# Patient Record
Sex: FEMALE | Employment: FULL TIME | URBAN - METROPOLITAN AREA
[De-identification: names, ages, dates, MRNs, and addresses within clinical notes are randomized per-mention and may not be internally consistent; named-entity substitution may affect disease eponyms.]

---

## 2018-04-02 ENCOUNTER — OFFICE VISIT (OUTPATIENT)
Dept: URGENT CARE | Facility: MEDICAL CENTER | Age: 23
End: 2018-04-02
Payer: COMMERCIAL

## 2018-04-02 VITALS
RESPIRATION RATE: 20 BRPM | WEIGHT: 218.2 LBS | HEART RATE: 98 BPM | BODY MASS INDEX: 40.15 KG/M2 | TEMPERATURE: 97.8 F | OXYGEN SATURATION: 98 % | SYSTOLIC BLOOD PRESSURE: 120 MMHG | DIASTOLIC BLOOD PRESSURE: 78 MMHG | HEIGHT: 62 IN

## 2018-04-02 DIAGNOSIS — J30.2 ACUTE SEASONAL ALLERGIC RHINITIS, UNSPECIFIED TRIGGER: Primary | ICD-10-CM

## 2018-04-02 PROCEDURE — G0382 LEV 3 HOSP TYPE B ED VISIT: HCPCS | Performed by: NURSE PRACTITIONER

## 2018-04-02 RX ORDER — FLUTICASONE PROPIONATE 50 MCG
1 SPRAY, SUSPENSION (ML) NASAL DAILY
Qty: 16 G | Refills: 0 | Status: SHIPPED | OUTPATIENT
Start: 2018-04-02

## 2018-04-02 RX ORDER — DESOGESTREL AND ETHINYL ESTRADIOL 0.15-0.03
1 KIT ORAL DAILY
COMMUNITY

## 2018-04-02 NOTE — PROGRESS NOTES
[facility]    NAME: Mk Santiago is a 23 y o  female  : 1995    MRN: [id number]  DATE: 2018  TIME: 2:20 PM    Assessment and Plan   Acute seasonal allergic rhinitis, unspecified trigger [J30 2]  1  Acute seasonal allergic rhinitis, unspecified trigger  Pseudoephedrine-Ibuprofen (ADVIL COLD/SINUS)  MG TABS    fluticasone (FLONASE) 50 mcg/act nasal spray         Patient Instructions   1  Advil cold and sinus as directed for congestion  2  Flonase 1 spray each nostril daily  3  Tylenol as needed for fever/pain  4  Once you are no longer congestion start Claritin daily  5  Follow up with your PCP      Chief Complaint     Chief Complaint   Patient presents with    Cold Like Symptoms     x 6 days- sore thoat/cough and head congestion  sinus pain          History of Present Illness       22 y/o female presents with cough, sore throat, sinus congestion and headache for 6 days  She denies abdominal pain, N/V/D, ear pain, CP, or SOB  She has taken Theraflu, Afrin, Mucinex, and Tylenol with minimal relief  Review of Systems   Review of Systems   Constitutional: Negative for chills, fatigue and fever  HENT: Positive for congestion, postnasal drip, rhinorrhea and sinus pain  Negative for ear pain and sore throat  Respiratory: Positive for cough  Negative for shortness of breath  Cardiovascular: Negative for chest pain  Gastrointestinal: Negative for abdominal pain, diarrhea, nausea and vomiting  Musculoskeletal: Negative for myalgias  Skin: Negative for rash  Neurological: Positive for headaches           Current Medications       Current Outpatient Prescriptions:     desogestrel-ethinyl estradiol (APRI) 0 15-30 MG-MCG per tablet, Take 1 tablet by mouth daily, Disp: , Rfl:     fluticasone (FLONASE) 50 mcg/act nasal spray, 1 spray into each nostril daily, Disp: 16 g, Rfl: 0    Pseudoephedrine-Ibuprofen (ADVIL COLD/SINUS)  MG TABS, Take 2 tablets by mouth every 8 (eight) hours as needed (congestion), Disp: 20 each, Rfl: 0    Current Allergies     Allergies as of 04/02/2018 - Reviewed 04/02/2018   Allergen Reaction Noted    Prednisone Confusion 04/02/2018            The following portions of the patient's history were reviewed and updated as appropriate: allergies, current medications, past family history, past medical history, past social history, past surgical history and problem list      Past Medical History:   Diagnosis Date    Heart murmur        Past Surgical History:   Procedure Laterality Date    WISDOM TOOTH EXTRACTION         History reviewed  No pertinent family history  Medications have been verified  Objective   /78   Pulse 98   Temp 97 8 °F (36 6 °C) (Temporal)   Resp 20   Ht 5' 2" (1 575 m)   Wt 99 kg (218 lb 3 2 oz)   SpO2 98%   BMI 39 91 kg/m²        Physical Exam     Physical Exam   Constitutional: She is oriented to person, place, and time  She appears well-developed and well-nourished  She is active  No distress  HENT:   Head: Normocephalic  Right Ear: Tympanic membrane, external ear and ear canal normal    Left Ear: Tympanic membrane, external ear and ear canal normal    Nose: Rhinorrhea present  Mouth/Throat: Uvula is midline and oropharynx is clear and moist  No oropharyngeal exudate, posterior oropharyngeal edema or posterior oropharyngeal erythema  Eyes: Right eye exhibits no discharge  Left eye exhibits no discharge  Neck: Normal range of motion  Cardiovascular: Normal rate, regular rhythm, S1 normal, S2 normal and normal heart sounds  Exam reveals no S3 and no distant heart sounds  No murmur heard  Pulmonary/Chest: Effort normal and breath sounds normal  No respiratory distress  She has no decreased breath sounds  She has no wheezes  She has no rales  She exhibits no tenderness  Abdominal: Soft  There is no tenderness     Lymphadenopathy:        Head (right side): No submental and no submandibular adenopathy present  Head (left side): No submental and no submandibular adenopathy present  Neurological: She is alert and oriented to person, place, and time  GCS eye subscore is 4  GCS verbal subscore is 5  GCS motor subscore is 6  Skin: Skin is warm and dry  No rash noted  She is not diaphoretic

## 2018-04-02 NOTE — PATIENT INSTRUCTIONS
1  Advil cold and sinus as directed for congestion  2  Flonase 1 spray each nostril daily  3  Tylenol as needed for fever/pain  4  Once you are no longer congestion start Claritin daily  5   Follow up with your PCP

## 2018-04-02 NOTE — LETTER
April 2, 2018     Patient: Everett Mcclellan   YOB: 1995   Date of Visit: 4/2/2018       To Whom it May Concern:    Everett Mcclellan was seen in my clinic on 4/2/2018  She may return to work on 4/3/2018  If you have any questions or concerns, please don't hesitate to call           Sincerely,          [facility] [place]        CC: No Recipients

## 2021-03-31 ENCOUNTER — HOSPITAL ENCOUNTER (EMERGENCY)
Facility: HOSPITAL | Age: 26
Discharge: HOME | End: 2021-03-31
Attending: EMERGENCY MEDICINE
Payer: COMMERCIAL

## 2021-03-31 VITALS
HEART RATE: 98 BPM | DIASTOLIC BLOOD PRESSURE: 63 MMHG | OXYGEN SATURATION: 99 % | RESPIRATION RATE: 16 BRPM | TEMPERATURE: 98 F | BODY MASS INDEX: 39.92 KG/M2 | SYSTOLIC BLOOD PRESSURE: 134 MMHG | WEIGHT: 218.26 LBS

## 2021-03-31 DIAGNOSIS — O28.3 ABNORMAL FETAL ULTRASOUND: Primary | ICD-10-CM

## 2021-03-31 PROCEDURE — 99284 EMERGENCY DEPT VISIT MOD MDM: CPT | Performed by: EMERGENCY MEDICINE

## 2021-03-31 PROCEDURE — 99283 EMERGENCY DEPT VISIT LOW MDM: CPT

## 2021-03-31 NOTE — ED PROVIDER NOTES
Pt Name: Maria Eugenia Mujica  MRN: [id number]  [place] 1995  Age/Sex: 30 y o  female  Date of evaluation: 3/31/2021  PCP: Lila Julian MD    CHIEF COMPLAINT    Chief Complaint   Patient presents with    Threatened Miscarriage     Pt had an OB appt yesterday and they could find a heartbeat  Pt here today to "double check"          HPI    30 y o  female presenting with request for a recheck of an ultrasound  Patient states she was seen by her OB yesterday, and ultrasound was performed revealing twin gestation, unfortunately, there was no heartbeat with either fetus  Patient denies any current symptoms including pain or bleeding but is requesting repeat ultrasound in order to verify results of ultrasound yesterday  She denies trauma, fever, other symptoms  HPI      Past Medical and Surgical History    Past Medical History:   Diagnosis Date    Heart murmur        Past Surgical History:   Procedure Laterality Date    WISDOM TOOTH EXTRACTION         No family history on file  Social History     Tobacco Use    Smoking status: Never Smoker    Smokeless tobacco: Never Used   Substance Use Topics    Alcohol use: Yes     Comment: social    Drug use: Yes     Types: Marijuana           Allergies    Allergies   Allergen Reactions    Prednisone Confusion       Home Medications    Prior to Admission medications    Medication Sig Start Date End Date Taking? Authorizing Provider   desogestrel-ethinyl estradiol (APRI) 0 15-30 MG-MCG per tablet Take 1 tablet by mouth daily    Historical Provider, MD   fluticasone (FLONASE) 50 mcg/act nasal spray 1 spray into each nostril daily 4/2/18   CHETAN Owusu   Pseudoephedrine-Ibuprofen (ADVIL COLD/SINUS)  MG TABS Take 2 tablets by mouth every 8 (eight) hours as needed (congestion) 4/2/18   CHETAN Owusu           Review of Systems    Review of Systems   Constitutional: Negative for activity change, chills and fever     HENT: Negative for drooling and facial swelling  Eyes: Negative for pain, discharge and visual disturbance  Respiratory: Negative for apnea, cough, chest tightness, shortness of breath and wheezing  Cardiovascular: Negative for chest pain and leg swelling  Gastrointestinal: Negative for abdominal pain, constipation, diarrhea, nausea and vomiting  Genitourinary: Negative for difficulty urinating, dysuria and urgency  Musculoskeletal: Negative for arthralgias, back pain and gait problem  Skin: Negative for color change and rash  Neurological: Negative for dizziness, speech difficulty, weakness and headaches  Psychiatric/Behavioral: Negative for agitation, behavioral problems and confusion  All other systems reviewed and negative  Physical Exam      ED Triage Vitals [03/31/21 1021]   Temperature Pulse Respirations Blood Pressure SpO2   98 °F (36 7 °C) 98 16 134/63 99 %      Temp src Heart Rate Source Patient Position - Orthostatic VS BP Location FiO2 (%)   -- -- -- -- --      Pain Score       --               Physical Exam  Vitals signs and nursing note reviewed  Constitutional:       Appearance: She is well-developed  HENT:      Head: Normocephalic and atraumatic  Eyes:      Conjunctiva/sclera: Conjunctivae normal       Pupils: Pupils are equal, round, and reactive to light  Neck:      Musculoskeletal: Normal range of motion and neck supple  Cardiovascular:      Rate and Rhythm: Normal rate and regular rhythm  Heart sounds: Normal heart sounds  Pulmonary:      Effort: Pulmonary effort is normal  No respiratory distress  Breath sounds: Normal breath sounds  No wheezing or rales  Abdominal:      General: There is no distension  Palpations: Abdomen is soft  Tenderness: There is no abdominal tenderness  There is no guarding or rebound  Comments: Slightly gravid   Musculoskeletal: Normal range of motion  General: No deformity  Skin:     General: Skin is warm and dry  Findings: No erythema or rash  Neurological:      Mental Status: She is alert and oriented to person, place, and time  Psychiatric:         Behavior: Behavior normal          Thought Content: Thought content normal          Judgment: Judgment normal               Diagnostic Results    Bedside ultrasound performed, two fetuses identified but unfortunately no heartbeat was identified in either despite multiple imaging windows  Labs:    Results Reviewed     None          All labs reviewed and utilized in the medical decision making process    Radiology:    No orders to display       All radiology studies independently viewed by me and interpreted by the radiologist     Procedure    Procedures        ED Course of Care and Re-Assessments      Patient counseled regarding ultrasound findings  Medications - No data to display        FINAL IMPRESSION    Final diagnoses:   Abnormal fetal ultrasound         DISPOSITION/PLAN    Presentation as above with request for verification of sound for yesterday  Bedside ultrasound performed with no heartbeat identified in either feet is unsure station as above  Patient counseled regarding findings as well as diagnostic uncertainty with use bedside ultrasound, referred back to [facility] [place] for scheduled appointment today at 1330  Given strict precautions  Time reflects when diagnosis was documented in both MDM as applicable and the Disposition within this note     Time User Action Codes Description Comment    3/31/2021 10:39 AM Yobany GOFF Add [O28 3] Abnormal fetal ultrasound       ED Disposition     ED Disposition Condition Date/Time Comment    Discharge Stable Wed Mar 31, 2021 10:36 AM Chandra Room discharge to home/self care              Follow-up Information     Follow up With Specialties Details Why Contact Info Additional [address] [facility] Emergency Medicine Go to  If symptoms worsen [place] [address]  [address] [facility], [address] [facility], [place], [place], [address]    Your OBGYN provider  Go today As previously scheduled [facility] - [address] [facility], [address] [place] [address] [place]   [phone]             PATIENT REFERRED TO:    [address]  [address] [facility] [address] [phone]  Go to   If symptoms worsen    Your OBGYN provider  [facility] - [address] [facility], [address] [facility] [address]   [phone]  Go today  As previously scheduled      DISCHARGE MEDICATIONS:    Discharge Medication List as of 3/31/2021 10:41 AM      CONTINUE these medications which have NOT CHANGED    Details   desogestrel-ethinyl estradiol (APRI) 0 15-30 MG-MCG per tablet Take 1 tablet by mouth daily, Historical Med      fluticasone (FLONASE) 50 mcg/act nasal spray 1 spray into each nostril daily, Starting Mon 4/2/2018, Normal      Pseudoephedrine-Ibuprofen (ADVIL COLD/SINUS)  MG TABS Take 2 tablets by mouth every 8 (eight) hours as needed (congestion), Starting Mon 4/2/2018, Normal             No discharge procedures on file           MD Jayashree Martinez MD  03/31/21 [id number]

## 2025-02-03 ENCOUNTER — HOSPITAL ENCOUNTER (EMERGENCY)
Facility: HOSPITAL | Age: 30
Discharge: HOME | End: 2025-02-03
Attending: EMERGENCY MEDICINE
Payer: COMMERCIAL

## 2025-02-03 ENCOUNTER — APPOINTMENT (EMERGENCY)
Dept: CT IMAGING | Facility: HOSPITAL | Age: 30
End: 2025-02-03
Payer: COMMERCIAL

## 2025-02-03 ENCOUNTER — APPOINTMENT (EMERGENCY)
Dept: RADIOLOGY | Facility: HOSPITAL | Age: 30
End: 2025-02-03
Payer: COMMERCIAL

## 2025-02-03 VITALS
SYSTOLIC BLOOD PRESSURE: 116 MMHG | HEIGHT: 63 IN | RESPIRATION RATE: 20 BRPM | BODY MASS INDEX: 41.99 KG/M2 | WEIGHT: 236.99 LBS | HEART RATE: 87 BPM | TEMPERATURE: 97.8 F | DIASTOLIC BLOOD PRESSURE: 67 MMHG | OXYGEN SATURATION: 99 %

## 2025-02-03 DIAGNOSIS — S20.211A RIB CONTUSION, RIGHT, INITIAL ENCOUNTER: ICD-10-CM
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
## 2025-02-03 DIAGNOSIS — M25.552 LEFT HIP PAIN: ICD-10-CM

## 2025-02-03 DIAGNOSIS — S50.12XA CONTUSION OF LEFT FOREARM, INITIAL ENCOUNTER: ICD-10-CM

## 2025-02-03 DIAGNOSIS — S30.1XXA CONTUSION OF ABDOMINAL WALL, INITIAL ENCOUNTER: ICD-10-CM

## 2025-02-03 LAB
ATRIAL RATE: 86 BPM
ATRIAL RATE: 92 BPM
BILIRUB UR QL STRIP: NEGATIVE
CLARITY UR: CLEAR
COLOR UR: COLORLESS
EXT PREGNANCY TEST URINE: NEGATIVE
EXT. CONTROL: NORMAL
GLUCOSE UR STRIP-MCNC: NEGATIVE MG/DL
HGB UR QL STRIP.AUTO: NEGATIVE
KETONES UR STRIP-MCNC: NEGATIVE MG/DL
LEUKOCYTE ESTERASE UR QL STRIP: NEGATIVE
NITRITE UR QL STRIP: NEGATIVE
P AXIS: 44 DEGREES
P AXIS: 57 DEGREES
PH UR STRIP.AUTO: 6 [PH]
PR INTERVAL: 182 MS
PR INTERVAL: 186 MS
PROT UR STRIP-MCNC: NEGATIVE MG/DL
QRS AXIS: 53 DEGREES
QRS AXIS: 57 DEGREES
QRSD INTERVAL: 76 MS
QRSD INTERVAL: 82 MS
QT INTERVAL: 342 MS
QT INTERVAL: 364 MS
QTC INTERVAL: 422 MS
QTC INTERVAL: 435 MS
SP GR UR STRIP.AUTO: 1.03 (ref 1–1.03)
T WAVE AXIS: 28 DEGREES
T WAVE AXIS: 30 DEGREES
UROBILINOGEN UR STRIP-ACNC: <2 MG/DL
VENTRICULAR RATE: 86 BPM
VENTRICULAR RATE: 92 BPM

## 2025-02-03 PROCEDURE — 81003 URINALYSIS AUTO W/O SCOPE: CPT | Performed by: EMERGENCY MEDICINE

## 2025-02-03 PROCEDURE — 72125 CT NECK SPINE W/O DYE: CPT

## 2025-02-03 PROCEDURE — 99284 EMERGENCY DEPT VISIT MOD MDM: CPT | Performed by: EMERGENCY MEDICINE

## 2025-02-03 PROCEDURE — 74177 CT ABD & PELVIS W/CONTRAST: CPT

## 2025-02-03 PROCEDURE — 73502 X-RAY EXAM HIP UNI 2-3 VIEWS: CPT

## 2025-02-03 PROCEDURE — 96374 THER/PROPH/DIAG INJ IV PUSH: CPT

## 2025-02-03 PROCEDURE — 71260 CT THORAX DX C+: CPT

## 2025-02-03 PROCEDURE — 71045 X-RAY EXAM CHEST 1 VIEW: CPT

## 2025-02-03 PROCEDURE — 93010 ELECTROCARDIOGRAM REPORT: CPT | Performed by: INTERNAL MEDICINE

## 2025-02-03 PROCEDURE — 93005 ELECTROCARDIOGRAM TRACING: CPT

## 2025-02-03 PROCEDURE — 99285 EMERGENCY DEPT VISIT HI MDM: CPT

## 2025-02-03 PROCEDURE — 73090 X-RAY EXAM OF FOREARM: CPT

## 2025-02-03 PROCEDURE — 70450 CT HEAD/BRAIN W/O DYE: CPT

## 2025-02-03 PROCEDURE — 81025 URINE PREGNANCY TEST: CPT | Performed by: EMERGENCY MEDICINE

## 2025-02-03 RX ORDER — KETOROLAC TROMETHAMINE 30 MG/ML
15 INJECTION, SOLUTION INTRAMUSCULAR; INTRAVENOUS ONCE
Status: COMPLETED | OUTPATIENT
Start: 2025-02-03 | End: 2025-02-03

## 2025-02-03 RX ADMIN — KETOROLAC TROMETHAMINE 15 MG: 30 INJECTION, SOLUTION INTRAMUSCULAR at 09:37

## 2025-02-03 RX ADMIN — IOHEXOL 100 ML: 350 INJECTION, SOLUTION INTRAVENOUS at 10:23

## 2025-02-03 NOTE — Clinical Note
Tyesha Miller was seen and treated in our emergency department on 2/3/2025.    No restrictions            Diagnosis:     Tyesha  .    She may return on this date: 02/07/2025         If you have any questions or concerns, please don't hesitate to call.      CHETAN Pichardo    ______________________________           _______________          _______________  Hospital Representative                              Date                                Time

## 2025-02-03 NOTE — Clinical Note
Tyesha Miller was seen and treated in our emergency department on 2/3/2025.    No restrictions            Diagnosis:     Tyesha  .    She may return on this date: 02/06/2025         If you have any questions or concerns, please don't hesitate to call.      Radha Abel, DO    ______________________________           _______________          _______________  Hospital Representative                              Date                                Time
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
